# Patient Record
Sex: FEMALE | Race: WHITE | Employment: PART TIME | URBAN - NONMETROPOLITAN AREA
[De-identification: names, ages, dates, MRNs, and addresses within clinical notes are randomized per-mention and may not be internally consistent; named-entity substitution may affect disease eponyms.]

---
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
## 2022-10-05 NOTE — ED PROVIDER NOTES
[address]  Urgent Care Encounter       CHIEF COMPLAINT       Chief Complaint   Patient presents with    Pharyngitis    Otalgia       Nurses Notes reviewed and I agree except as noted in the HPI. HISTORY OF PRESENT ILLNESS   Philip Navas is a 27 y.o. female who presents urgent care center with mother complaining of a sudden onset of a sore throat that started yesterday. Patient stated that she did not sleep very well last night and developed right ear pain. Patient rates her discomfort 10 on a 10 scale. Patient denied any fever chills, chest pain, cough shortness of breath patient is nauseated and vomiting saliva into the trash can at the present time. Patient denied any diarrhea or abdominal pain. Patient states that she has not had any urinary symptoms as well. Patient denies any known sick contacts that she does work full-time at a [employer] and student as well. She does have a history of tonsillectomy as well as myringotomy and tube placement. Patient did take some Tylenol other than that she has not taken any medication. The history is provided by the patient and a parent. No  was used.    Pharyngitis  Location:  Generalized  Quality:  Sore  Severity:  Severe  Onset quality:  Sudden  Duration:  1 day  Timing:  Constant  Progression:  Unchanged  Chronicity:  New  Relieved by:  Nothing  Worsened by:  Drinking, eating and swallowing  Ineffective treatments:  Acetaminophen  Associated symptoms: ear pain    Associated symptoms: no adenopathy, no chills, no cough, no drooling, no fever, no neck stiffness, no rash, no rhinorrhea, no sinus congestion and no trouble swallowing    Ear pain:     Location:  Right    Severity:  Severe    Onset quality:  Sudden    Duration:  1 day    Timing:  Constant    Progression:  Unchanged  Risk factors: no exposure to strep, no sick contacts and no recent ENT procedure        REVIEW OF SYSTEMS     Review of Systems Constitutional: Positive for appetite change and fatigue. Negative for activity change, chills, diaphoresis and fever. HENT: Positive for ear pain and sore throat. Negative for congestion, drooling, rhinorrhea, sinus pressure and trouble swallowing. Respiratory: Negative for cough. Gastrointestinal: Positive for nausea. Negative for diarrhea and vomiting. Musculoskeletal: Negative for back pain and neck stiffness. Skin: Positive for pallor. Negative for rash. Allergic/Immunologic: Negative for environmental allergies. Neurological: Negative for dizziness and light-headedness. Hematological: Negative for adenopathy. PAST MEDICAL HISTORY   History reviewed. No pertinent past medical history. SURGICALHISTORY     Patient  has a past surgical history that includes Myringotomy Tympanostomy Tube Placement and Tonsillectomy. CURRENT MEDICATIONS       Discharge Medication List as of 1/5/2021 10:20 AM      CONTINUE these medications which have NOT CHANGED    Details   ibuprofen (ADVIL;MOTRIN) 600 MG tablet Take 600 mg by mouth every 6 hours as needed for PainHistorical Med             ALLERGIES     Patient is has No Known Allergies. Patients   There is no immunization history on file for this patient. FAMILY HISTORY     Patient's family history is not on file. SOCIAL HISTORY     Patient  reports that she has never smoked. She has never used smokeless tobacco. She reports that she does not drink alcohol or use drugs. PHYSICAL EXAM     ED TRIAGE VITALS  BP: 130/72, Temp: 98.9 °F (37.2 °C), Heart Rate: (!) 113, Resp: 16, SpO2: 97 %,Estimated body mass index is 26.52 kg/m² as calculated from the following:    Height as of this encounter: 5' 2\" (1.575 m). Weight as of this encounter: 145 lb (65.8 kg). ,Patient's last menstrual period was 01/04/2021 (exact date). Physical Exam  Vitals signs and nursing note reviewed. Constitutional:       General: She is not in acute distress. Appearance: Normal appearance. She is well-developed and well-groomed. She is not ill-appearing, toxic-appearing or diaphoretic. HENT:      Head: Normocephalic. Right Ear: Hearing, ear canal and external ear normal. No drainage, swelling or tenderness. A middle ear effusion is present. No mastoid tenderness. Tympanic membrane is bulging. Tympanic membrane is not erythematous. Left Ear: Hearing, tympanic membrane, ear canal and external ear normal. No drainage, swelling or tenderness. No middle ear effusion. No mastoid tenderness. Tympanic membrane is not erythematous. Nose: Nose normal.      Right Sinus: No maxillary sinus tenderness or frontal sinus tenderness. Left Sinus: No maxillary sinus tenderness or frontal sinus tenderness. Mouth/Throat:      Lips: Pink. Mouth: Mucous membranes are moist.      Pharynx: Uvula midline. Pharyngeal swelling and posterior oropharyngeal erythema present. No uvula swelling. Tonsils: No tonsillar exudate or tonsillar abscesses. Eyes:      Conjunctiva/sclera: Conjunctivae normal.      Pupils: Pupils are equal, round, and reactive to light. Neck:      Musculoskeletal: Full passive range of motion without pain and normal range of motion. No neck rigidity. Cardiovascular:      Rate and Rhythm: Regular rhythm. Tachycardia present. Heart sounds: Normal heart sounds. Pulmonary:      Effort: Pulmonary effort is normal. No accessory muscle usage. Breath sounds: Normal breath sounds. No decreased breath sounds, wheezing, rhonchi or rales. Abdominal:      General: Bowel sounds are normal.      Palpations: Abdomen is soft. Tenderness: There is no abdominal tenderness. There is no right CVA tenderness, left CVA tenderness or guarding. Negative signs include Amor's sign. Lymphadenopathy:      Head:      Right side of head: No submental, submandibular, tonsillar, preauricular, posterior auricular or occipital adenopathy.       Left side of head: No submental, submandibular, tonsillar, preauricular, posterior auricular or occipital adenopathy. Cervical: No cervical adenopathy. Right cervical: No superficial, deep or posterior cervical adenopathy. Left cervical: No superficial, deep or posterior cervical adenopathy. Upper Body:      Right upper body: No supraclavicular adenopathy. Left upper body: No supraclavicular adenopathy. Skin:     General: Skin is warm and dry. Capillary Refill: Capillary refill takes less than 2 seconds. Findings: No rash. Neurological:      Mental Status: She is alert and oriented to person, place, and time. Psychiatric:         Mood and Affect: Mood normal.         Behavior: Behavior normal. Behavior is cooperative. DIAGNOSTIC RESULTS     Labs:No results found for this visit on 01/05/21. IMAGING:    No orders to display         EKG:      URGENT CARE COURSE:     Vitals:    01/05/21 0937   BP: 130/72   Pulse: (!) 113   Resp: 16   Temp: 98.9 °F (37.2 °C)   TempSrc: Temporal   SpO2: 97%   Weight: 145 lb (65.8 kg)   Height: 5' 2\" (1.575 m)       Medications   ondansetron (ZOFRAN-ODT) disintegrating tablet 4 mg (4 mg Oral Given 1/5/21 1004)            PROCEDURES:  None    FINAL IMPRESSION      1. Right otitis media with effusion    2. Acute pharyngitis, unspecified etiology    3. Nausea          DISPOSITION/ PLAN        The patient was advised to take medication as directed. The patient was also advised to drink lots of fluids, monitor urine output for hydration status or dark colored urine. The patient could take Motrin or Tylenol for comfort, pain and fever. The Patient/Patient representative was advised to monitor for any changes such as fever not relieved with Motrin or Tylenol.   Also monitor for any difficulty swallowing, neck pain or stiffness, increase in swollen glands, the development of rash or any other concerns they are to dial 911 or go to the emergency department for reevaluation and further management. If the patient does not experience any of the above symptoms now to follow-up with her primary care provider for reevaluation in 3-5 days. The patient/Patient representative are agreeable to the treatment plan at this time the patient is not in acute distress and the patient left in stable condition.      PATIENT REFERRED TO:  MD Jim Pryor [address] [facility] [address] / [facility] [id number]      DISCHARGE MEDICATIONS:  Discharge Medication List as of 1/5/2021 10:20 AM      START taking these medications    Details   amoxicillin (AMOXIL) 500 MG capsule Take 1 capsule by mouth 3 times daily for 10 days, Disp-30 capsule, R-0Normal      ondansetron (ZOFRAN ODT) 4 MG disintegrating tablet Take 1 tablet by mouth every 8 hours as needed for Nausea, Disp-10 tablet, R-0Normal             Discharge Medication List as of 1/5/2021 10:20 AM          Discharge Medication List as of 1/5/2021 10:20 AM          VILMA Vázquez CNP    (Please note that portions of this note were completed with a voice recognition program. Efforts were made to edit the dictations but occasionally words are mis-transcribed.)           VILMA Vázquez CNP  01/05/21 8422 Graft Donor Site Bandage (Optional-Leave Blank If You Don't Want In Note): Steri-strips and a pressure bandage were applied to the donor site.